# Patient Record
Sex: MALE | Race: BLACK OR AFRICAN AMERICAN | NOT HISPANIC OR LATINO
[De-identification: names, ages, dates, MRNs, and addresses within clinical notes are randomized per-mention and may not be internally consistent; named-entity substitution may affect disease eponyms.]

---

## 2023-07-17 ENCOUNTER — APPOINTMENT (OUTPATIENT)
Dept: ORTHOPEDIC SURGERY | Facility: CLINIC | Age: 68
End: 2023-07-17
Payer: MEDICARE

## 2023-07-17 DIAGNOSIS — M25.552 PAIN IN LEFT HIP: ICD-10-CM

## 2023-07-17 PROBLEM — Z00.00 ENCOUNTER FOR PREVENTIVE HEALTH EXAMINATION: Status: ACTIVE | Noted: 2023-07-17

## 2023-07-17 PROCEDURE — 73521 X-RAY EXAM HIPS BI 2 VIEWS: CPT

## 2023-07-17 PROCEDURE — 99204 OFFICE O/P NEW MOD 45 MIN: CPT

## 2023-07-17 RX ORDER — METHYLPREDNISOLONE 4 MG/1
4 TABLET ORAL
Qty: 1 | Refills: 4 | Status: ACTIVE | COMMUNITY
Start: 2023-07-17 | End: 1900-01-01

## 2023-07-17 NOTE — DISCUSSION/SUMMARY
[Medication Risks Reviewed] : Medication risks reviewed [de-identified] : Patient I discussed at length the underlying etiology of his left buttock pain.  Patient states that he has specific pain in the area of the buttock not into the groin or into the leg.  He is not ambulating with a external support.  He does have a history of lumbar surgery.\par \par We talked at length about the underlying etiology of his left buttock pain and I find to be most consistent with a diagnosis of left-sided lumbar radiculopathy.  I personally reviewed the radiographs today as well as the CT report it appears that this is a fairly chronic fracture patient does not have pain with weightbearing he does not have any weakness in terms of flexion only mild weakness with abduction.  I believe the abductor mechanism is intact for this patient.\par \par We will place patient on a Medrol Dosepak reason risk benefits were discussed in detail including potential side effects.  Patient will return in 2 weeks if not thoroughly improved.  We reviewed the dosing regimen for the Medrol Dosepak and his current medication profile appears to be no relative contraindication to its current use.\par \par Today's consultation lasted 45 minutes

## 2023-07-17 NOTE — PHYSICAL EXAM
[de-identified] : Patient is full passive internal/external rotation left hip at 90 degrees with no pain or discomfort.  He has 5/5 flexion strength 4-5 abduction strength.  He is neurovascular intact distally. [de-identified] : Hip radiographs were ordered today AP and lateral both hips were obtained showing a left hip replacement with a old fracture of the greater trochanter.  The characteristic of the fracture did not of appear to have this as an acute fracture but rather more chronic and there is abundant callus on the lateral projection of the femur to suggest that this is a healed fracture.  Patient also had intraoperative cabling which indicates this may be a fracture that was sustained during his procedure.  Patient denies any secondary procedure on his hip.
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
## 2023-08-02 ENCOUNTER — APPOINTMENT (OUTPATIENT)
Dept: ORTHOPEDIC SURGERY | Facility: CLINIC | Age: 68
End: 2023-08-02
Payer: MEDICARE

## 2023-08-02 VITALS — HEIGHT: 68 IN | BODY MASS INDEX: 33.34 KG/M2 | WEIGHT: 220 LBS

## 2023-08-02 PROCEDURE — 99214 OFFICE O/P EST MOD 30 MIN: CPT

## 2023-08-02 PROCEDURE — 99204 OFFICE O/P NEW MOD 45 MIN: CPT

## 2023-08-02 NOTE — DISCUSSION/SUMMARY
[de-identified] : PREOP SHOULDER SURGERY DISCUSSION:  PROCEDURE DISCUSSED - QUESTIONS ANSWERED PATIENT WISHES TO PROCEED  POST OP CARE AND LIMITATIONS REVIEWED - HANDOUT PROVIDED   COLD PACKS RECOMMENDED ANALGESICS AND  ANTI NAUSEA MEDS PRESCRIBED  COLACE RECOMMENDED   THERE ARE NO GUARANTEES THAT ALL SYMPTOMS WILL BE ALLEVIATED   SHOULDER ARTHROSCOPY, ACROMIOPLASTY, DEBRIDEMENT,  RC REPAIR AND LABRUM REPAIRS- ON AVERAGE 75- 85% SATISFACTORY RESULTS FOR TEARS < 3CM AFTER 9-12 MONTHS HEALING AND REHABILITATION.   REPAIRS WILL REQUIRE STRICT SHOULDER IMMOBILIZER 4-6 WEEKS  RC TEARS 3CM OR LARGER MAY REQUIRE COLLAGEN PATCH AUGMENTATION GENERALLY HAVE LESS SATISFACTORY RESULTS  PHYSICAL THERAPY REQUIRED 2X WEEK FOR  MINIMUM 8-12 WEEKS FOR ALL PROCEDURES  CONTINUED HOME EXERCISES 6-9 MONTHS AFTER THAT REQUIRED FOR OPTIMAL OUTCOMES   ROUTINE SURGICAL AND ANESTHETIC RISKS INCLUDE RISK OF SURGICAL INFECTION, ANESTHETIC COMPLICATION OR ALLERGY, POSSIBLE RETEARS OR PROGRESSION OF TEAR, STIFFNESS OF SHOULDER AND UNSATISFACTORY OUTCOMES  PATIENT UNDERSTANDS AND WISHES TO PROCEED
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
## 2023-08-02 NOTE — PHYSICAL EXAM
[de-identified] : PHYSICAL EXAM LEFT  SHOULDER  NECK EXAM  FROM NONTENDER  SPURLING  RIGHT=NEG, LEFT=NEG  MODERATE  SCAPULAR PROTRACTION AROM 110 / 110 /  70 / 15  TENDER: SA REGION LATERAL   SPECIAL TESTING : SÁNCHEZ - POSITIVE  CELSO - POSITIVE  SPEED TEST - POSITIVE  MENON - NEGATIVE  APPREHENSION AND SUPPRESSION - NEGATIVE   RC STRENGTH TESTING  SS:  4/5 SUB 5/5 IS     5/5 BICEPS  5/5  SENSATION  - GROSSLY INTACT    [de-identified] : Date of Exam: 06-   EXAM:  MRI LEFT SHOULDER WITHOUT CONTRAST IMPRESSION:  1. Marked arthrosis at the inferior aspect of the glenohumeral joint with lesser changes more superiorly.  2. Mild hypertrophic changes of the acromioclavicular joint.  3. Partial tear of the distal supraspinatus tendon favoring the footprint and appearing less prominent in comparison to the previous examination. 4. Partial tearing of the distal infraspinatus tendon at the footprint which is less prominent in comparison to the previous exam.  5. Partial tearing of the subscapularis tendon with mild retraction of some of the articular surface fibers, with progression since the previous exam.  6. Large SLAP tear partially undermining the long head the biceps tendon anchor with the tear extending far anteriorly and inferiorly and far posteriorly and inferiorly, as before. 7.  1.9 cm paralabral ganglion cyst inferior to the glenoid with a different configuration than on the previous exam. 8. Mild amount of fluid/synovitis of the subacromial subdeltoid bursa.

## 2023-08-07 ENCOUNTER — APPOINTMENT (OUTPATIENT)
Dept: ORTHOPEDIC SURGERY | Facility: CLINIC | Age: 68
End: 2023-08-07
Payer: MEDICARE

## 2023-08-07 DIAGNOSIS — M25.511 PAIN IN RIGHT SHOULDER: ICD-10-CM

## 2023-08-07 PROCEDURE — 73521 X-RAY EXAM HIPS BI 2 VIEWS: CPT

## 2023-08-07 PROCEDURE — 73030 X-RAY EXAM OF SHOULDER: CPT | Mod: RT

## 2023-08-07 NOTE — PROCEDURE
[de-identified] : [facility] [id number] LOT # [id number] EXP MAR 25 1% 500MG/50ML  KENALOG-10 Spontaneously NDC [id number] LOT # [id number] EXP 06/24 50MG/5ML  Patient was given a cortisone injection into the glenohumeral humeral joint of the right shoulder via posterior approach under sterile conditions.  Patient tolerated injection well.
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
## 2023-08-07 NOTE — PHYSICAL EXAM
[de-identified] : Right shoulder patient has limited forward elevation actively to approximately 90 degrees abduction 65 degrees he has discomfort with internal/external rotation at 60 degrees.  Positive impingement sign. [de-identified] : Repeat radiographs of his hips were obtained there is no interval change in the fracture components and again there is noted abundant bridging Callus at the fracture site of the inferred intraoperative fracture.  As far as right shoulder is concerned patient's fairly advanced glenohumeral arthritis.

## 2023-08-07 NOTE — DISCUSSION/SUMMARY
[de-identified] : Patient I reviewed directly the right shoulder radiographs.  Patient was able to appreciate the lack of cartilage in the glenohumeral articulation.  We talked about the potential need to consider shoulder replacement surgery if he does not see sustained symptomatic improvement with today's injection.  Patient will be referred to our shoulder specialist for further evaluation and treatment if he does not see sustained symptomatic improvement.  Today's consultation lasted 45 minutes

## 2023-08-30 ENCOUNTER — APPOINTMENT (OUTPATIENT)
Dept: ORTHOPEDIC SURGERY | Facility: CLINIC | Age: 68
End: 2023-08-30
Payer: MEDICARE

## 2023-08-30 DIAGNOSIS — M75.42 IMPINGEMENT SYNDROME OF LEFT SHOULDER: ICD-10-CM

## 2023-08-30 DIAGNOSIS — M75.112 INCOMPLETE ROTATOR CUFF TEAR OR RUPTURE OF LEFT SHOULDER, NOT SPECIFIED AS TRAUMATIC: ICD-10-CM

## 2023-08-30 DIAGNOSIS — M19.011 PRIMARY OSTEOARTHRITIS, RIGHT SHOULDER: ICD-10-CM

## 2023-08-30 PROCEDURE — 99213 OFFICE O/P EST LOW 20 MIN: CPT

## 2023-08-30 NOTE — PHYSICAL EXAM
[de-identified] : PHYSICAL EXAM  RIGHT  SHOULDER  NECK EXAM  FROM NONTENDER  SPURLING  RIGHT=NEG, LEFT=NEG  MILD SCAPULAR PROTRACTION AROM 120 / 120 / 70 / 0  TENDER: GH JOINT  SPECIAL TESTING : SÁNCHEZ - POSITIVE  CELSO - POSITIVE  SPEED TEST - POSITIVE  MENON - NEGATIVE  APPREHENSION AND SUPPRESSION - NEGATIVE   RC STRENGTH TESTING  SS:  5/5 SUB 5/5 IS     5/5 BICEPS  5/5  SENSATION  - GROSSLY INTACT    [de-identified] : RIGHT SHOULDER XRAY (2 VIEWS - AP AND OUTLET) -   NO OBVIOUS FRACTURE ,  SEPARATION OR DISLOCATION  GRADE 1-2  OSTEOARTHRITIS,
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
## 2023-09-05 ENCOUNTER — APPOINTMENT (OUTPATIENT)
Dept: ORTHOPEDIC SURGERY | Facility: HOSPITAL | Age: 68
End: 2023-09-05

## 2023-09-08 ENCOUNTER — APPOINTMENT (OUTPATIENT)
Dept: ORTHOPEDIC SURGERY | Facility: CLINIC | Age: 68
End: 2023-09-08

## 2025-08-26 ENCOUNTER — NON-APPOINTMENT (OUTPATIENT)
Age: 70
End: 2025-08-26